# Patient Record
Sex: MALE | Race: WHITE | Employment: PART TIME | URBAN - NONMETROPOLITAN AREA
[De-identification: names, ages, dates, MRNs, and addresses within clinical notes are randomized per-mention and may not be internally consistent; named-entity substitution may affect disease eponyms.]

---

## 2022-05-17 ENCOUNTER — HOSPITAL ENCOUNTER (EMERGENCY)
Age: 22
Discharge: HOME | End: 2022-05-17
Attending: EMERGENCY MEDICINE
Payer: COMMERCIAL

## 2022-05-17 ENCOUNTER — APPOINTMENT (OUTPATIENT)
Dept: GENERAL RADIOLOGY | Age: 22
End: 2022-05-17
Payer: COMMERCIAL

## 2022-05-17 VITALS
BODY MASS INDEX: 32.96 KG/M2 | WEIGHT: 210 LBS | HEIGHT: 67 IN | DIASTOLIC BLOOD PRESSURE: 112 MMHG | RESPIRATION RATE: 16 BRPM | HEART RATE: 98 BPM | OXYGEN SATURATION: 98 % | TEMPERATURE: 98 F | SYSTOLIC BLOOD PRESSURE: 207 MMHG

## 2022-05-17 DIAGNOSIS — S61.211A LACERATION OF LEFT INDEX FINGER WITHOUT FOREIGN BODY WITHOUT DAMAGE TO NAIL, INITIAL ENCOUNTER: Primary | ICD-10-CM

## 2022-05-17 DIAGNOSIS — S61.213A LACERATION OF LEFT MIDDLE FINGER WITHOUT FOREIGN BODY WITHOUT DAMAGE TO NAIL, INITIAL ENCOUNTER: ICD-10-CM

## 2022-05-17 PROCEDURE — 6360000002 HC RX W HCPCS: Performed by: STUDENT IN AN ORGANIZED HEALTH CARE EDUCATION/TRAINING PROGRAM

## 2022-05-17 PROCEDURE — 99284 EMERGENCY DEPT VISIT MOD MDM: CPT

## 2022-05-17 PROCEDURE — 73130 X-RAY EXAM OF HAND: CPT

## 2022-05-17 PROCEDURE — 90471 IMMUNIZATION ADMIN: CPT | Performed by: STUDENT IN AN ORGANIZED HEALTH CARE EDUCATION/TRAINING PROGRAM

## 2022-05-17 PROCEDURE — 90715 TDAP VACCINE 7 YRS/> IM: CPT | Performed by: STUDENT IN AN ORGANIZED HEALTH CARE EDUCATION/TRAINING PROGRAM

## 2022-05-17 RX ORDER — GINSENG 100 MG
CAPSULE ORAL ONCE
Status: DISCONTINUED | OUTPATIENT
Start: 2022-05-17 | End: 2022-05-17 | Stop reason: HOSPADM

## 2022-05-17 RX ORDER — LIDOCAINE HYDROCHLORIDE 10 MG/ML
20 INJECTION, SOLUTION EPIDURAL; INFILTRATION; INTRACAUDAL; PERINEURAL ONCE
Status: DISCONTINUED | OUTPATIENT
Start: 2022-05-17 | End: 2022-05-17 | Stop reason: HOSPADM

## 2022-05-17 RX ORDER — AMOXICILLIN AND CLAVULANATE POTASSIUM 875; 125 MG/1; MG/1
1 TABLET, FILM COATED ORAL 2 TIMES DAILY
Qty: 10 TABLET | Refills: 0 | Status: SHIPPED | OUTPATIENT
Start: 2022-05-17 | End: 2022-05-22

## 2022-05-17 RX ORDER — LIDOCAINE HYDROCHLORIDE 10 MG/ML
INJECTION, SOLUTION INFILTRATION; PERINEURAL
Status: DISCONTINUED
Start: 2022-05-17 | End: 2022-05-17 | Stop reason: HOSPADM

## 2022-05-17 RX ADMIN — TETANUS TOXOID, REDUCED DIPHTHERIA TOXOID AND ACELLULAR PERTUSSIS VACCINE, ADSORBED 0.5 ML: 5; 2.5; 8; 8; 2.5 SUSPENSION INTRAMUSCULAR at 16:17

## 2022-05-17 ASSESSMENT — PAIN SCALES - GENERAL: PAINLEVEL_OUTOF10: 3

## 2022-05-17 ASSESSMENT — PAIN - FUNCTIONAL ASSESSMENT: PAIN_FUNCTIONAL_ASSESSMENT: 0-10

## 2022-05-17 ASSESSMENT — PAIN DESCRIPTION - ORIENTATION: ORIENTATION: LEFT

## 2022-05-17 ASSESSMENT — PAIN DESCRIPTION - LOCATION: LOCATION: FINGER (COMMENT WHICH ONE)

## 2022-05-17 NOTE — ED PROVIDER NOTES
August ENCOUNTER          Pt Name: Remberto Billings  MRN: [id number]  [place] 2000  Date of evaluation: 5/17/2022  Treating Resident Physician: Nia Lui MD  Supervising Physician: Dr. Kamini Marie       Chief Complaint   Patient presents with    Laceration     History obtained from the patient. HISTORY OF PRESENT ILLNESS    HPI  Remberto Billings is a 19 y.o. male who presents to the emergency department for evaluation of laceration to the left index and ring fingers. The patient was pushing through some MDF plywood through a table saw when the plywood shot back and cut the dorsal aspect of these fingers. States that he still has fine touch sensation, denies paresthesias, denies pallor or discoloration. Unsure of last tetanus booster. The patient has no other acute complaints at this time. REVIEW OF SYSTEMS   Review of Systems   Skin: Positive for wound. All other systems reviewed and are negative. PAST MEDICAL AND SURGICAL HISTORY   No past medical history on file. No past surgical history on file. MEDICATIONS   No current facility-administered medications for this encounter. Current Outpatient Medications:     amoxicillin-clavulanate (AUGMENTIN) 875-125 MG per tablet, Take 1 tablet by mouth 2 times daily for 5 days, Disp: 10 tablet, Rfl: 0      SOCIAL HISTORY     Social History     Social History Narrative    Not on file     Social History     Tobacco Use    Smoking status: Not on file    Smokeless tobacco: Not on file   Substance Use Topics    Alcohol use: Not on file    Drug use: Not on file         ALLERGIES   No Known Allergies      FAMILY HISTORY   No family history on file. PREVIOUS RECORDS   Previous records reviewed: I reviewed the patient's past medical records including relevant labs, imaging and procedures.           PHYSICAL EXAM     ED Triage Vitals   BP Temp Temp Source Pulse Resp SpO2 Height Weight   05/17/22 1507 05/17/22 1503 05/17/22 1503 05/17/22 1503 05/17/22 1503 05/17/22 1503 05/17/22 1503 05/17/22 1503   (!) 207/112 98 °F (36.7 °C) Oral 98 16 98 % 5' 7\" (1.702 m) 210 lb (95.3 kg)     Initial vital signs and nursing assessment reviewed and abnormal from hypertension. Body mass index is 32.89 kg/m². Pulsoximetry is normal per my interpretation. Additional Vital Signs:  Vitals:    05/17/22 1507   BP: (!) 207/112   Pulse:    Resp:    Temp:    SpO2:        Physical Exam  Constitutional:       Appearance: Normal appearance. He is obese. HENT:      Head: Normocephalic and atraumatic. Right Ear: External ear normal.      Left Ear: External ear normal.      Nose: Nose normal.      Mouth/Throat:      Mouth: Mucous membranes are moist.      Pharynx: Oropharynx is clear. Eyes:      Extraocular Movements: Extraocular movements intact. Conjunctiva/sclera: Conjunctivae normal.   Cardiovascular:      Rate and Rhythm: Normal rate. Pulses: Normal pulses. Pulmonary:      Effort: Pulmonary effort is normal.   Abdominal:      General: Abdomen is flat. Musculoskeletal:      Right hand: Normal.      Left hand: Laceration present. Normal strength. Normal sensation. Normal capillary refill. Cervical back: Normal range of motion and neck supple. Comments: Full ROM to left 2nd and 3rd to MCPJ, PIPJ, and DIPJ   Skin:     General: Skin is warm and dry. Capillary Refill: Capillary refill takes less than 2 seconds. Neurological:      General: No focal deficit present. Mental Status: He is alert and oriented to person, place, and time. Psychiatric:         Mood and Affect: Mood normal.         Behavior: Behavior normal.                 MEDICAL DECISION MAKING   Initial Assessment: This is a 17-year-old male coming in for laceration to the dorsal aspects of the left second and third digits with neurovascularly intact status and unknown tetanus status. Vital signs stable and bleeding is controlled at this time. Differential diagnosis includes but is not limited to:  Laceration of the fingers, vascular damage, neuro damage    Although some of these diagnoses are unlikely they were considered in my medical decision making. Plan:    Thorough irrigation  Lac repair  Tdap booster  Abx  D/c home with f/u to hand specialist        ED RESULTS   Laboratory results:  Labs Reviewed - No data to display    Radiologic studies results:  XR HAND LEFT (MIN 3 VIEWS)   Final Result      No fracture or dislocation. Final report electronically signed by Dr. Karin Lomeli on 5/17/2022 3:56 PM          ED Medications administered this visit:   Medications   Tetanus-Diphth-Acell Pertussis (BOOSTRIX) injection 0.5 mL (0.5 mLs IntraMUSCular Given 5/17/22 1617)         ED COURSE     ED Course as of 05/17/22 2050   Tue May 17, 2022   1633 XR HAND LEFT (MIN 3 VIEWS)     IMPRESSION:     No fracture or dislocation. [JT]   1723 Consulted Ortho to inform them unable to close the laceration on the middle finger. We will have him follow-up at [facility] and put him on a course of antibiotics. Ortho agrees with plan. [JT]      ED Course User Index  [JT] Clarisa Van MD         MDM:   Irrigation using tap water. Neurovascular intact status before and after lac repair. Unable to close the laceration over the dorsum of the middle finger given avulsion of tissue so this was left open and therefore patient will require antibiotics. Tdap booster given as patient is unsure of last tetanus shot and this is not readily available. Patient tolerated laceration repair well. Full range of motion MCP J, DIPJ, PIPJ through flexion and extension against resistance indicating that tendon damage is unlikely. Vital signs stable patient appears well and comfortable and feels comfortable being discharged with follow-up to hand specialist and antibiotics.   Patient verbalizes understanding of plan and is okay with this. Strict return precautions and follow up instructions were discussed with the patient prior to discharge, with which the patient agrees. MEDICATION CHANGES     Discharge Medication List as of 5/17/2022  5:23 PM      START taking these medications    Details   amoxicillin-clavulanate (AUGMENTIN) 875-125 MG per tablet Take 1 tablet by mouth 2 times daily for 5 days, Disp-10 tablet, R-0Print               FINAL DISPOSITION     Final diagnoses:   Laceration of left index finger without foreign body without damage to nail, initial encounter   Laceration of left middle finger without foreign body without damage to nail, initial encounter     Condition: condition: good  Dispo: Discharge to home      This transcription was electronically signed. Parts of this transcriptions may have been dictated by use of voice recognition software and electronically transcribed, and parts may have been transcribed with the assistance of an ED scribe. The transcription may contain errors not detected in proofreading. Please refer to my supervising physician's documentation if my documentation differs. Electronically Signed: Ankush Seo MD, 05/17/22, 8:50 PM          Ankush Seo MD  Resident  05/17/22 2052    Attending Supervising Physician's [address] [place] [address] Rd Statement  I performed a history and physical examination on the patient and discussed the management with the resident physician. I reviewed and agree with the findings and plan as documented in his note unless described otherwise below. Pt presents to the ER with finger lacerations x2, one on middle figner with avulsed fragment of skin, unable to approximate edges, other lac repaired. Will add abx, wounds dressed, f/u with ortho/hand arranged, patient counseled regarding importance of close outpatient f/u and ER return precautions. Pts tendon function intact, full ROM against resistance at each joint.       Electronically signed by Roland Montes MD on 5/17/22 at 9:56 PM EDT       Uyen Loo MD  05/17/22 [id number]